# Patient Record
Sex: FEMALE | Race: WHITE | NOT HISPANIC OR LATINO | Employment: FULL TIME | URBAN - METROPOLITAN AREA
[De-identification: names, ages, dates, MRNs, and addresses within clinical notes are randomized per-mention and may not be internally consistent; named-entity substitution may affect disease eponyms.]

---

## 2017-10-04 ENCOUNTER — APPOINTMENT (OUTPATIENT)
Dept: GENERAL RADIOLOGY | Facility: CLINIC | Age: 37
End: 2017-10-04
Attending: EMERGENCY MEDICINE
Payer: OTHER MISCELLANEOUS

## 2017-10-04 ENCOUNTER — HOSPITAL ENCOUNTER (EMERGENCY)
Facility: CLINIC | Age: 37
Discharge: HOME | End: 2017-10-04
Attending: EMERGENCY MEDICINE | Admitting: EMERGENCY MEDICINE
Payer: OTHER MISCELLANEOUS

## 2017-10-04 VITALS
RESPIRATION RATE: 18 BRPM | OXYGEN SATURATION: 99 % | SYSTOLIC BLOOD PRESSURE: 124 MMHG | TEMPERATURE: 98.4 F | WEIGHT: 150.5 LBS | HEART RATE: 64 BPM | DIASTOLIC BLOOD PRESSURE: 94 MMHG
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
## 2017-10-04 DIAGNOSIS — S69.92XA HAND INJURY, LEFT, INITIAL ENCOUNTER: ICD-10-CM

## 2017-10-04 PROCEDURE — 73130 X-RAY EXAM OF HAND: CPT | Mod: LT

## 2017-10-04 PROCEDURE — 99284 EMERGENCY DEPT VISIT MOD MDM: CPT | Mod: Z6 | Performed by: EMERGENCY MEDICINE

## 2017-10-04 PROCEDURE — 25000132 ZZH RX MED GY IP 250 OP 250 PS 637: Performed by: EMERGENCY MEDICINE

## 2017-10-04 PROCEDURE — 99283 EMERGENCY DEPT VISIT LOW MDM: CPT | Performed by: EMERGENCY MEDICINE

## 2017-10-04 RX ORDER — MAGNESIUM HYDROXIDE 1200 MG/15ML
LIQUID ORAL
Status: DISCONTINUED
Start: 2017-10-04 | End: 2017-10-04 | Stop reason: HOSPADM

## 2017-10-04 RX ORDER — IBUPROFEN 600 MG/1
600 TABLET, FILM COATED ORAL ONCE
Status: COMPLETED | OUTPATIENT
Start: 2017-10-04 | End: 2017-10-04

## 2017-10-04 RX ADMIN — IBUPROFEN 600 MG: 600 TABLET ORAL at 12:33

## 2017-10-04 ASSESSMENT — ENCOUNTER SYMPTOMS
NECK STIFFNESS: 0
SHORTNESS OF BREATH: 0
FEVER: 0
COLOR CHANGE: 0
HEADACHES: 0
EYE REDNESS: 0
ABDOMINAL PAIN: 0
ARTHRALGIAS: 0
DIFFICULTY URINATING: 0
CONFUSION: 0

## 2017-10-04 NOTE — DISCHARGE INSTRUCTIONS
You were seen today for crush injury to your hand.  Please make an appointment to follow up with Your Primary Care Provider in 3-4  days if not improving.      Crush Injury of the Hand, No Fracture  You have a crush injury of your hand. This causes local pain, swelling, and sometimes bruising. You don t have any broken bones. This injury may take from a few days to a few weeks to heal.  If a fingernail has been severely injured, it may fall off in 1 to 2 weeks. A new one will usually start to grow back within a month.  Home care  Follow these guidelines when caring for yourself at home:    Keep your hand elevated to reduce pain and swelling. When sitting or lying down keep your arm raised above the level of your heart. You can do this by placing your arm on a pillow that rests on your chest or on a pillow at your side. This is most important during the first 2 days (48 hours) after the injury.    Put an ice pack on the injured area. Do this for 20 minutes every 1 to 2 hours the first day for pain relief. You can make an ice pack by wrapping a plastic bag of ice cubes in a thin towel. As the ice melts, be careful that the cast doesn t get wet. Continue to use the ice pack 3 to 4 times a day until the pain and swelling go away.    You may use acetaminophen or ibuprofen to control pain, unless another pain medicine was prescribed. If you have chronic liver or kidney disease, talk with your health care provider before using these medicines. Also talk with your provider if you ve had a stomach ulcer or GI bleeding.    If you have a splint or cast, keep it dry at all times. Bathe with your splint or cast well out of the water. Protect it with a large plastic bag, rubber-banded at the top end. If a fiberglass cast or splint gets wet, you can dry it with a hair dryer.    Don t put creams or objects under the cast if you have itching.    Don t stick a needle into the wound to drain it.    Bruised skin may change colors over  time. It may change from reddish to bluish to yellowish before the skin goes back to normal coloring.  Follow-up care  Follow up with your health care provider, or as advised, if you don t start to get better within the next 3 days.  If X-rays were taken, a radiologist will look at them. You will be told of any new findings that may affect your care.  When to seek medical advice  Call your health care provider right away if any of these occur:    The cast cracks    The plaster cast or splint becomes wet or soft    The fiberglass cast or splint stays wet for more than 24 hours    Tightness or pain under the cast or splint gets worse    Fingers become swollen, cold, blue, numb, or tingly    Redness, warmth, swelling, drainage from the wound, or foul odor from a cast or splint    You can t move your fingers    Fever of 101 F (38.3 C) or higher, or as directed by your health care provider   Date Last Reviewed: 2/15/2015    8126-7348 [employer]. [address] [place] [address]. All rights reserved. This information is not intended as a substitute for professional medical care. Always follow your healthcare professional's instructions.

## 2017-10-04 NOTE — ED NOTES
Arrived to ED d/t c/o hand injury to left side, smashed hand between elevator and a box at work,  laceration noted, swelling noted to middle finger, ring cut off with ring cutter, bleeding controlled, CMS intact, VSS, Tetanus shot up-to-date per [facility]

## 2017-10-04 NOTE — ED AVS SNAPSHOT
[facility], [place], Emergency Department    [address] [place] [address]    Phone:  [phone]                                       Joelle Abrams   MRN: [id number]    Department:  [facility], Emergency Department   Date of Visit:  10/4/2017           After Visit Summary Signature Page     I have received my discharge instructions, and my questions have been answered. I have discussed any challenges I see with this plan with the nurse or doctor.    ..........................................................................................................................................  Patient/Patient Representative Signature      ..........................................................................................................................................  Patient Representative Print Name and Relationship to Patient    ..................................................               ................................................  Date                                            Time    ..........................................................................................................................................  Reviewed by Signature/Title    ...................................................              ..............................................  Date                                                            Time

## 2017-10-04 NOTE — ED AVS SNAPSHOT
[facility], Emergency Department    [address] [facility] [address]    Phone:  [phone]                                       Joelle Abrams   MRN: [id number]    Department:  [facility], Emergency Department   Date of Visit:  10/4/2017           Patient Information     Date Of Birth          1980        Your diagnoses for this visit were:     Hand injury, left, initial encounter        You were seen by Kristen Carter MD.        Discharge Instructions       You were seen today for crush injury to your hand.  Please make an appointment to follow up with Your Primary Care Provider in 3-4  days if not improving.      Crush Injury of the Hand, No Fracture  You have a crush injury of your hand. This causes local pain, swelling, and sometimes bruising. You don t have any broken bones. This injury may take from a few days to a few weeks to heal.  If a fingernail has been severely injured, it may fall off in 1 to 2 weeks. A new one will usually start to grow back within a month.  Home care  Follow these guidelines when caring for yourself at home:    Keep your hand elevated to reduce pain and swelling. When sitting or lying down keep your arm raised above the level of your heart. You can do this by placing your arm on a pillow that rests on your chest or on a pillow at your side. This is most important during the first 2 days (48 hours) after the injury.    Put an ice pack on the injured area. Do this for 20 minutes every 1 to 2 hours the first day for pain relief. You can make an ice pack by wrapping a plastic bag of ice cubes in a thin towel. As the ice melts, be careful that the cast doesn t get wet. Continue to use the ice pack 3 to 4 times a day until the pain and swelling go away.    You may use acetaminophen or ibuprofen to control pain, unless another pain medicine was prescribed. If you have chronic liver or kidney disease, talk with your health care provider before using these  medicines. Also talk with your provider if you ve had a stomach ulcer or GI bleeding.    If you have a splint or cast, keep it dry at all times. Bathe with your splint or cast well out of the water. Protect it with a large plastic bag, rubber-banded at the top end. If a fiberglass cast or splint gets wet, you can dry it with a hair dryer.    Don t put creams or objects under the cast if you have itching.    Don t stick a needle into the wound to drain it.    Bruised skin may change colors over time. It may change from reddish to bluish to yellowish before the skin goes back to normal coloring.  Follow-up care  Follow up with your health care provider, or as advised, if you don t start to get better within the next 3 days.  If X-rays were taken, a radiologist will look at them. You will be told of any new findings that may affect your care.  When to seek medical advice  Call your health care provider right away if any of these occur:    The cast cracks    The plaster cast or splint becomes wet or soft    The fiberglass cast or splint stays wet for more than 24 hours    Tightness or pain under the cast or splint gets worse    Fingers become swollen, cold, blue, numb, or tingly    Redness, warmth, swelling, drainage from the wound, or foul odor from a cast or splint    You can t move your fingers    Fever of 101 F (38.3 C) or higher, or as directed by your health care provider   Date Last Reviewed: 2/15/2015    3083-9153 [employer]. [address] [place] [address]. All rights reserved. This information is not intended as a substitute for professional medical care. Always follow your healthcare professional's instructions.          24 Hour Appointment Hotline       To make an appointment at any [facility], call [phone] ([phone]). If you don't have a family doctor or clinic, we will help you find one. [facility] are conveniently located to serve the needs of you and your  "family.             Review of your medicines      Notice     You have not been prescribed any medications.            Procedures and tests performed during your visit     XR Hand Left G/E 3 Views      Orders Needing Specimen Collection     None      Pending Results     No orders found from 10/2/2017 to 10/5/2017.            Pending Culture Results     No orders found from 10/2/2017 to 10/5/2017.            Pending Results Instructions     If you had any lab results that were not finalized at the time of your Discharge, you can call the ED Lab Result RN at [phone]. You will be contacted by this team for any positive Lab results or changes in treatment. The nurses are available 7 days a week from 10A to 6:30P.  You can leave a message 24 hours per day and they will return your call.        Thank you for choosing [facility]       Thank you for choosing [facility] for your care. Our goal is always to provide you with excellent care. Hearing back from our patients is one way we can continue to improve our services. Please take a few minutes to complete the written survey that you may receive in the mail after you visit with us. Thank you!        [employer] Information     [employer] lets you send messages to your doctor, view your test results, renew your prescriptions, schedule appointments and more. To sign up, go to [email or web address] . Click on \"Log in\" on the left side of the screen, which will take you to the Welcome page. Then click on \"Sign up Now\" on the right side of the page.     You will be asked to enter the access code listed below, as well as some personal information. Please follow the directions to create your username and password.     Your access code is: [email or web address]  Expires: 2018 12:13 PM     Your access code will  in 90 days. If you need help or a new code, please call your [facility] clinic or [phone].        Care EveryWhere ID     This is your Care EveryWhere ID. This could be used " by other organizations to access your [facility] medical records  [id number]        Equal Access to Services     CECILIA CLINTON : Neel Graham, silva santoyo, devendra pisano. So [facility] [phone].    ATENCIÓN: Si habla español, tiene a carrillo disposición servicios gratuitos de asistencia lingüística. Llame al [phone].    We comply with applicable federal civil rights laws and [place] laws. We do not discriminate on the basis of race, color, national origin, age, disability, sex, sexual orientation, or gender identity.            After Visit Summary       This is your record. Keep this with you and show to your community pharmacist(s) and doctor(s) at your next visit.

## 2017-10-04 NOTE — ED PROVIDER NOTES
History     Chief Complaint   Patient presents with     Hand Injury     The history is provided by the patient.     Joelle Abrams is a 36 year old otherwise healthy right hand dominant female who presents with left hand crush injury.    Patient was moving a box out of an elevator at work and crushed her hand at the 2-5th proximal phalanx and immediately presented to the ED. She did not injure her thumb, palm, wrist, or arm. No injury to her head.     Patient reports 7/10 pain along area of injury, does not radiate. She also reports swelling and pain limiting her range of motion and numbness in her fingers.     She denies any systemic symptoms including no fevers, chills, chest pain, shortness of breath, abdominal pain, nausea/vomiting.     I have reviewed the Medications, Allergies, Past Medical and Surgical History, and Social History in the Epic system.    Review of Systems   Constitutional: Negative for fever.   HENT: Negative for congestion.    Eyes: Negative for redness.   Respiratory: Negative for shortness of breath.    Cardiovascular: Negative for chest pain.   Gastrointestinal: Negative for abdominal pain.   Genitourinary: Negative for difficulty urinating.   Musculoskeletal: Negative for arthralgias and neck stiffness.   Skin: Negative for color change.   Neurological: Negative for headaches.   Psychiatric/Behavioral: Negative for confusion.     History reviewed. No pertinent past medical history.    History reviewed. No pertinent surgical history.    No family history on file.    Social History   Substance Use Topics     Smoking status: Never Smoker     Smokeless tobacco: Never Used     Alcohol use Not on file     Physical Exam   BP: (!) 123/97  Pulse: 60  Temp: 98.4  F (36.9  C)  Resp: 18  Weight: 68.3 kg (150 lb 8 oz)  SpO2: 100 %  Physical Exam   Constitutional: She appears well-developed and well-nourished. No distress.   HENT:   Head: Normocephalic and atraumatic.   Mouth/Throat: Oropharynx is  clear and moist.   Eyes: No scleral icterus.   Cardiovascular: Normal rate, regular rhythm and normal heart sounds.  Exam reveals no gallop and no friction rub.    No murmur heard.  Pulmonary/Chest: Effort normal and breath sounds normal. No respiratory distress. She has no wheezes. She has no rales.   Abdominal: Soft. She exhibits no distension. There is no tenderness. There is no rebound and no guarding.   Musculoskeletal:        Left wrist: Normal. She exhibits normal range of motion, no tenderness, no bony tenderness, no swelling, no effusion, no deformity and no laceration.   L hand superficial abrasion with small superficial skin tears with small oozing blood on 2nd, 3rd, and 4th proximal phalanx with swelling around 2nd-5th MCP joint, 5/5 flexion and extension at DIP and PIP joint at the left hand.  All fingertips warm and pink. Intact capillary refill, intact sensation to light touch.     Neurological: She is alert. No cranial nerve deficit.   Skin: Skin is warm and dry.     ED Course     ED Course     3-view x-ray of left hand and wrist:  FINDINGS: AP, oblique, and lateral views of the left hand and wrist  were obtained. The bones are well aligned. The joint spaces are  well-maintained. No displaced fractures. Mild irregularity in the soft  tissues at the level of the left fourth proximal phalanx.  IMPRESSION: No acute bone abnormality in the left hand or wrist.    Procedures        Critical Care time:  none   Assessments & Plan (with Medical Decision Making)   Joelle Abrams is a 36 year old otherwise healthy right hand dominant female who presents with left hand crush injury without evidence of fracture.  Patient with intact flexion and and extension so no concern for flexor or extensor injuries. No concern for nerve or vascular compromise at this time. Patient's left arm was bandaged and wrapped. Instructed patient to keep hand elevated to reduce pain and swelling and to use acetaminophen or ibuprofen to  control the pain.  Instructed patient to return for re-evaluation if develops fever, loss of sensation in fingers, purulent drainage from the wound, or any concerning symptoms.    I have reviewed the nursing notes.    I have reviewed the findings, diagnosis, plan and need for follow up with the patient.    Final diagnoses:   Hand injury, left, initial encounter     10/4/2017   [facility], [place], EMERGENCY DEPARTMENT    Nancy Marley MD     Attending addendum:  This data collected with the Resident working in the Emergency Department.  Patient was seen and evaluated by myself and I repeated the history and physical exam with the patient.  The plan of care was discussed with them.  The key portions of the note including the entire assessment and plan reflect my documentation.    Tetanus status is up to date.     My exam:  left hand has swelling and tenderness of 2nd-5th MCPs and proximal phalynges. Superficial skin tear on dorsum of the proximal phaylnx of the 2nd-4th fingers. Also skin tear with hemorrhagic blister on palmar surface of proximal phaylnx of index and middle fingers. No full thickness skin lacerations. Distal capillary refill in all fingers is intact, sensation intact in all fingers and thumb and strength with flexion and extension intact at MCP, PIP and DIP joints of all fingers.     X-ray reviewed by me and results shown above.  Wounds were irrigated and dressed. Wound care and signs of infection, reasons to return were discussed with the patient.         Kristen Carter MD  10/04/17 2015

## 2021-05-27 ENCOUNTER — OFFICE VISIT (OUTPATIENT)
Dept: FAMILY MEDICINE | Facility: CLINIC | Age: 41
End: 2021-05-27
Payer: COMMERCIAL

## 2021-05-27 VITALS
DIASTOLIC BLOOD PRESSURE: 68 MMHG | RESPIRATION RATE: 16 BRPM | OXYGEN SATURATION: 100 % | WEIGHT: 138.3 LBS | SYSTOLIC BLOOD PRESSURE: 98 MMHG | HEART RATE: 58 BPM | HEIGHT: 69 IN | BODY MASS INDEX: 20.48 KG/M2

## 2021-05-27 DIAGNOSIS — Z00.00 ROUTINE GENERAL MEDICAL EXAMINATION AT A HEALTH CARE FACILITY: Primary | ICD-10-CM

## 2021-05-27 DIAGNOSIS — Z30.09 FAMILY PLANNING: ICD-10-CM

## 2021-05-27 DIAGNOSIS — Z11.3 SCREEN FOR STD (SEXUALLY TRANSMITTED DISEASE): ICD-10-CM

## 2021-05-27 DIAGNOSIS — G43.709 CHRONIC MIGRAINE WITHOUT AURA WITHOUT STATUS MIGRAINOSUS, NOT INTRACTABLE: ICD-10-CM

## 2021-05-27 DIAGNOSIS — R87.810 CERVICAL HIGH RISK HPV (HUMAN PAPILLOMAVIRUS) TEST POSITIVE: ICD-10-CM

## 2021-05-27 DIAGNOSIS — R53.83 OTHER FATIGUE: ICD-10-CM

## 2021-05-27 DIAGNOSIS — E55.9 VITAMIN D DEFICIENCY: ICD-10-CM

## 2021-05-27 DIAGNOSIS — N94.6 DYSMENORRHEA: ICD-10-CM

## 2021-05-27 LAB
DEPRECATED CALCIDIOL+CALCIFEROL SERPL-MC: 37 UG/L (ref 20–75)
ERYTHROCYTE [DISTWIDTH] IN BLOOD BY AUTOMATED COUNT: 12.9 % (ref 10–15)
FERRITIN SERPL-MCNC: 38 NG/ML (ref 12–150)
HCT VFR BLD AUTO: 38.8 % (ref 35–47)
HGB BLD-MCNC: 13 G/DL (ref 11.7–15.7)
HIV 1+2 AB+HIV1 P24 AG SERPL QL IA: NONREACTIVE
MCH RBC QN AUTO: 31.5 PG (ref 26.5–33)
MCHC RBC AUTO-ENTMCNC: 33.5 G/DL (ref 31.5–36.5)
MCV RBC AUTO: 94 FL (ref 78–100)
PLATELET # BLD AUTO: 261 10E9/L (ref 150–450)
RBC # BLD AUTO: 4.13 10E12/L (ref 3.8–5.2)
T PALLIDUM AB SER QL: NONREACTIVE
TSH SERPL DL<=0.005 MIU/L-ACNC: 2.51 MU/L (ref 0.4–4)
WBC # BLD AUTO: 4.2 10E9/L (ref 4–11)

## 2021-05-27 PROCEDURE — 82306 VITAMIN D 25 HYDROXY: CPT | Performed by: FAMILY MEDICINE

## 2021-05-27 PROCEDURE — 85027 COMPLETE CBC AUTOMATED: CPT | Performed by: FAMILY MEDICINE

## 2021-05-27 PROCEDURE — 82728 ASSAY OF FERRITIN: CPT | Performed by: FAMILY MEDICINE

## 2021-05-27 PROCEDURE — 99000 SPECIMEN HANDLING OFFICE-LAB: CPT | Performed by: FAMILY MEDICINE

## 2021-05-27 PROCEDURE — 99214 OFFICE O/P EST MOD 30 MIN: CPT | Mod: 25 | Performed by: FAMILY MEDICINE

## 2021-05-27 PROCEDURE — 87389 HIV-1 AG W/HIV-1&-2 AB AG IA: CPT | Performed by: FAMILY MEDICINE

## 2021-05-27 PROCEDURE — 87624 HPV HI-RISK TYP POOLED RSLT: CPT | Performed by: FAMILY MEDICINE

## 2021-05-27 PROCEDURE — 84443 ASSAY THYROID STIM HORMONE: CPT | Performed by: FAMILY MEDICINE

## 2021-05-27 PROCEDURE — 99385 PREV VISIT NEW AGE 18-39: CPT | Performed by: FAMILY MEDICINE

## 2021-05-27 PROCEDURE — 36415 COLL VENOUS BLD VENIPUNCTURE: CPT | Performed by: FAMILY MEDICINE

## 2021-05-27 PROCEDURE — 87491 CHLMYD TRACH DNA AMP PROBE: CPT | Performed by: FAMILY MEDICINE

## 2021-05-27 PROCEDURE — 86780 TREPONEMA PALLIDUM: CPT | Mod: 90 | Performed by: FAMILY MEDICINE

## 2021-05-27 PROCEDURE — 88175 CYTOPATH C/V AUTO FLUID REDO: CPT | Performed by: FAMILY MEDICINE

## 2021-05-27 PROCEDURE — 87591 N.GONORRHOEAE DNA AMP PROB: CPT | Performed by: FAMILY MEDICINE

## 2021-05-27 RX ORDER — ACETAMINOPHEN AND CODEINE PHOSPHATE 120; 12 MG/5ML; MG/5ML
0.35 SOLUTION ORAL
COMMUNITY
Start: 2021-03-25 | End: 2021-05-27

## 2021-05-27 RX ORDER — ACETAMINOPHEN AND CODEINE PHOSPHATE 120; 12 MG/5ML; MG/5ML
0.35 SOLUTION ORAL DAILY
Qty: 90 TABLET | Refills: 3 | Status: SHIPPED | OUTPATIENT
Start: 2021-05-27 | End: 2022-05-31

## 2021-05-27 SDOH — HEALTH STABILITY: MENTAL HEALTH: HOW MANY STANDARD DRINKS CONTAINING ALCOHOL DO YOU HAVE ON A TYPICAL DAY?: NOT ASKED

## 2021-05-27 SDOH — HEALTH STABILITY: MENTAL HEALTH: HOW OFTEN DO YOU HAVE A DRINK CONTAINING ALCOHOL?: NEVER

## 2021-05-27 SDOH — HEALTH STABILITY: MENTAL HEALTH: HOW OFTEN DO YOU HAVE 6 OR MORE DRINKS ON ONE OCCASION?: NEVER

## 2021-05-27 ASSESSMENT — MIFFLIN-ST. JEOR: SCORE: 1371.7

## 2021-05-27 NOTE — PROGRESS NOTES
"    Assessment & Plan     Throat pain  Viral vs post nasal drainage.  Supportive care.  Follow up if symptoms persist or worsen   - Streptococcus A Rapid Scr w Reflx to PCR  - Group A Streptococcus PCR Throat Swab                 Return in about 1 week (around 6/4/2021) for If symptoms persist or worsen.    ROSALINE Alarcon [facility]   Joelle is a 40 year old who presents for the following health issues     HPI     Acute Illness  Acute illness concerns: sore throat   Onset/Duration: Monday   Symptoms:  Fever: no  Chills/Sweats: no  Headache (location?): YES  Sinus Pressure: no  Conjunctivitis:  no  Ear Pain: YES- pressure bilateral   Rhinorrhea: no  Congestion: no  Sore Throat: YES  Cough: no  Wheeze: no  Decreased Appetite: no  Nausea: no  Vomiting: no  Diarrhea: no  Dysuria/Freq.: no  Dysuria or Hematuria: no  Fatigue/Achiness: YES  Sick/Strep Exposure: no  Therapies tried and outcome: ibuprofen       Review of Systems   Constitutional, HEENT, cardiovascular, pulmonary, gi and gu systems are negative, except as otherwise noted.      Objective    BP 96/66   Pulse 60   Temp 97.3  F (36.3  C) (Skin)   Resp 16   Ht 1.753 m (5' 9\")   Wt 62.6 kg (138 lb)   LMP 04/01/2021   SpO2 99%   BMI 20.38 kg/m    Body mass index is 20.38 kg/m .  Physical Exam   GENERAL: alert and no distress  EYES: Eyes grossly normal to inspection  HENT: normal cephalic/atraumatic, ear canals and TM's normal, nose and mouth without ulcers or lesions, oropharynx clear and tonsillar hypertrophy  RESP: lungs clear to auscultation - no rales, rhonchi or wheezes  CV: regular rate and rhythm, normal S1 S2, no S3 or S4, no murmur, click or rub, no peripheral edema and peripheral pulses strong    Results for orders placed or performed in visit on 05/28/21 (from the past 24 hour(s))   Streptococcus A Rapid Scr w Reflx to PCR    Specimen: Throat   Result Value Ref Range    Strep Specimen Description " Throat     Streptococcus Group A Rapid Screen Negative NEG^Negative

## 2021-05-27 NOTE — PROGRESS NOTES
SUBJECTIVE:   CC: Joelle Abrams is an 40 year old woman who presents for preventive health visit.       Patient has been advised of split billing requirements and indicates understanding: Yes  Healthy Habits:     Getting at least 3 servings of Calcium per day:  NO    Bi-annual eye exam:  Yes    Dental care twice a year:  Yes    Sleep apnea or symptoms of sleep apnea:  Daytime drowsiness    Diet:  Regular (no restrictions)    Frequency of exercise:  2-3 days/week    Duration of exercise:  45-60 minutes    Taking medications regularly:  Yes    Medication side effects:  None    PHQ-2 Total Score: 1    Additional concerns today:  Yes  1. Abnormal pap   2010 NILM  2012 ASCUS, HPV negative  2015 NILM, HPV negative  2018 LSIL, HPV Positive for High Risk HPV types other than 16 or 18  Colposcopy: FAUZIA 1  2019 ASCUS, HPV High Risk Other Than 16/18 detected 38 y.o.   Colposcopy: No FAUZIA  Plan: Cotesting (pap+HPV) in    2. dysmenorrhea & need fr Tylenol # 3   3. fatigue and low vitamin d.  Has been taking over the counter vitamin D- about 5000 international unit once daily or twice daily . Duncan not think she is depressed and lethargy cause her to be less interested in pleasurable activities . Unable to run outside and hike like before  Telemarketing job/ sedentary job.  4. Requesting screening for sexually transmitted infection as well     PROBLEMS TO ADD ON...    Today's PHQ-2 Score:   PHQ-2 ( 1999 Pfizer) 5/27/2021   Q1: Little interest or pleasure in doing things 1   Q2: Feeling down, depressed or hopeless 0   PHQ-2 Score 1   Q1: Little interest or pleasure in doing things Several days   Q2: Feeling down, depressed or hopeless Not at all   PHQ-2 Score 1       Abuse: Current or Past (Physical, Sexual or Emotional) - No  Do you feel safe in your environment? Yes    Have you ever done Advance Care Planning? (For example, a Health Directive, POLST, or a discussion with a medical provider or your loved ones about your  wishes): No, advance care planning information given to patient to review.  Patient declined advance care planning discussion at this time.    Social History     Tobacco Use     Smoking status: Never Smoker     Smokeless tobacco: Never Used   Substance Use Topics     Alcohol use: Never     Frequency: Never     Binge frequency: Never     If you drink alcohol do you typically have >3 drinks per day or >7 drinks per week? No    Alcohol Use 5/27/2021   Prescreen: >3 drinks/day or >7 drinks/week? No   Prescreen: >3 drinks/day or >7 drinks/week? -   No flowsheet data found.    Reviewed orders with patient.  Reviewed health maintenance and updated orders accordingly - Yes  BP Readings from Last 3 Encounters:   05/27/21 98/68   10/04/17 (!) 124/94   12/17/05 110/74    Wt Readings from Last 3 Encounters:   05/27/21 62.7 kg (138 lb 4.8 oz)   10/04/17 68.3 kg (150 lb 8 oz)                    Breast Cancer Screening:    Breast CA Risk Assessment (FHS-7) 5/27/2021   Do you have a family history of breast, colon, or ovarian cancer? No / Unknown       click delete button to remove this line now    Pertinent mammograms are reviewed under the imaging tab.    History of abnormal Pap smear: YES - updated in Problem List and Health Maintenance accordingly     Reviewed and updated as needed this visit by clinical staff  Tobacco  Allergies  Meds  Problems  Med Hx  Surg Hx  Fam Hx          Reviewed and updated as needed this visit by Provider  Tobacco  Allergies  Meds  Problems  Med Hx  Surg Hx  Fam Hx             Review of Systems  CONSTITUTIONAL: NEGATIVE for fever, chills, change in weight  INTEGUMENTARU/SKIN: NEGATIVE for worrisome rashes, moles or lesions  EYES: NEGATIVE for vision changes or irritation  ENT: NEGATIVE for ear, mouth and throat problems  RESP: NEGATIVE for significant cough or SOB  BREAST: NEGATIVE for masses, tenderness or discharge  CV: NEGATIVE for chest pain, palpitations or peripheral edema  GI:  "NEGATIVE for nausea, abdominal pain, heartburn, or change in bowel habits  : NEGATIVE for unusual urinary or vaginal symptoms. Periods are regular.  MUSCULOSKELETAL: NEGATIVE for significant arthralgias or myalgia  NEURO: NEGATIVE for weakness, dizziness or paresthesias  ENDOCRINE: NEGATIVE for temperature intolerance, skin/hair changes  PSYCHIATRIC: NEGATIVE for changes in mood or affect     OBJECTIVE:   BP 98/68   Pulse 58   Resp 16   Ht 1.753 m (5' 9\")   Wt 62.7 kg (138 lb 4.8 oz)   LMP 04/20/2021 (Approximate)   SpO2 100%   Breastfeeding No   BMI 20.42 kg/m    Physical Exam  GENERAL: healthy, alert and no distress  EYES: Eyes grossly normal to inspection, PERRL and conjunctivae and sclerae normal  HENT: ear canals and TM's normal, nose and mouth without ulcers or lesions  NECK: no adenopathy, no asymmetry, masses, or scars and thyroid normal to palpation  RESP: lungs clear to auscultation - no rales, rhonchi or wheezes  BREAST: normal without masses, tenderness or nipple discharge and no palpable axillary masses or adenopathy  CV: regular rate and rhythm, normal S1 S2, no S3 or S4, no murmur, click or rub, no peripheral edema and peripheral pulses strong  ABDOMEN: soft, nontender, no hepatosplenomegaly, no masses and bowel sounds normal   (female): normal female external genitalia, normal urethral meatus, vaginal mucosa pink, moist, well rugated, and normal cervix/adnexa/uterus without masses or discharge  MS: no gross musculoskeletal defects noted, no edema  SKIN: no suspicious lesions or rashes  NEURO: Normal strength and tone, mentation intact and speech normal  PSYCH: mentation appears normal, affect normal/bright    Diagnostic Test Results:  Labs reviewed in Epic    ASSESSMENT/PLAN:   Joelle was seen today for physical.    Diagnoses and all orders for this visit:    Routine general medical examination at a health care facility    Chronic migraine without aura without status migrainosus, not " intractable  Comments:  Infrequent headache - with progesterone only pills.      Cervical high risk HPV (human papillomavirus) test positive  -     Pap imaged thin layer diagnostic with HPV (select HPV order below)  -     HPV High Risk Types DNA Cervical  Abnormal pap   2010 NILM  2012 ASCUS, HPV negative  2015 NILM, HPV negative  2018 LSIL, HPV Positive for High Risk HPV types other than 16 or 18  Colposcopy: FAUZIA 1  2019 ASCUS, HPV High Risk Other Than 16/18 detected 38 y.o.   Colposcopy: No FAUZIA  Plan: Cotesting (pap+HPV) in  -completed 05/27/21    Dysmenorrhea  Comments:  She is given  # 10 tabs of requested tylenol # 3 for sever cramps - uses 1 once or twce each period  She is also willing to see OB. last OB visit 2019  Orders:  -     acetaminophen-codeine (TYLENOL #3) 300-30 MG tablet; Take 1 tablet by mouth once as needed for severe pain (dysmennorhea)  -     OB/GYN REFERRAL    Other fatigue  Comments:  fatige is making her unmotivated to excercise, and she reports its happened in past due to vitamin D. She has been taking vitamin D over the counter on and off.  She is a runner and not wanting to be running- a bit unusual for her.  Orders:  -     Vitamin D Deficiency  -     CBC with platelets  -     Ferritin  -     TSH with free T4 reflex    Vitamin D deficiency  Comments:  history of low ashley in D- 10/2020    Family planning  Comments:  need refill on ocp  Orders:  -     norethindrone (MICRONOR) 0.35 MG tablet; Take 1 tablet (0.35 mg) by mouth daily    Screen for STD (sexually transmitted disease)  -     Chlamydia trachomatis PCR  -     Neisseria gonorrhoeae PCR  -     Treponema Abs w Reflex to RPR and Titer  -     HIV Antigen Antibody Combo    Other orders  -     Cancel: Pap imaged thin layer screen with HPV - recommended age 30 - 65 years (select HPV order below)  -     Cancel: HPV High Risk Types DNA Cervical        Patient has been advised of split billing requirements and indicates understanding:  "Yes  COUNSELING:  Reviewed preventive health counseling, as reflected in patient instructions       Regular exercise       Healthy diet/nutrition       Vision screening       Hearing screening       Alcohol Use       Contraception       Family planning       Folic Acid       Safe sex practices/STD prevention       Consider Hep C screening for all patients one time for ages 18-79 years    Estimated body mass index is 20.42 kg/m  as calculated from the following:    Height as of this encounter: 1.753 m (5' 9\").    Weight as of this encounter: 62.7 kg (138 lb 4.8 oz).        She reports that she has never smoked. She has never used smokeless tobacco.      Counseling Resources:  ATP IV Guidelines  Pooled Cohorts Equation Calculator  Breast Cancer Risk Calculator  BRCA-Related Cancer Risk Assessment: FHS-7 Tool  FRAX Risk Assessment  ICSI Preventive Guidelines  Dietary Guidelines for Americans, 2010  USDA's MyPlate  ASA Prophylaxis  Lung CA Screening    Courtney Hedrick MD  [facility]  "

## 2021-05-28 ENCOUNTER — OFFICE VISIT (OUTPATIENT)
Dept: FAMILY MEDICINE | Facility: CLINIC | Age: 41
End: 2021-05-28
Payer: COMMERCIAL

## 2021-05-28 VITALS
RESPIRATION RATE: 16 BRPM | HEART RATE: 60 BPM | DIASTOLIC BLOOD PRESSURE: 66 MMHG | OXYGEN SATURATION: 99 % | WEIGHT: 138 LBS | HEIGHT: 69 IN | BODY MASS INDEX: 20.44 KG/M2 | TEMPERATURE: 97.3 F | SYSTOLIC BLOOD PRESSURE: 96 MMHG

## 2021-05-28 DIAGNOSIS — R07.0 THROAT PAIN: Primary | ICD-10-CM

## 2021-05-28 LAB
C TRACH DNA SPEC QL NAA+PROBE: NEGATIVE
DEPRECATED S PYO AG THROAT QL EIA: NEGATIVE
N GONORRHOEA DNA SPEC QL NAA+PROBE: NEGATIVE
SPECIMEN SOURCE: NORMAL
STREP GROUP A PCR: NOT DETECTED

## 2021-05-28 PROCEDURE — 99213 OFFICE O/P EST LOW 20 MIN: CPT | Performed by: PHYSICIAN ASSISTANT

## 2021-05-28 PROCEDURE — 99N1174 PR STATISTIC STREP A RAPID: Performed by: PHYSICIAN ASSISTANT

## 2021-05-28 PROCEDURE — 87651 STREP A DNA AMP PROBE: CPT | Performed by: PHYSICIAN ASSISTANT

## 2021-05-28 ASSESSMENT — MIFFLIN-ST. JEOR: SCORE: 1370.34

## 2021-05-29 NOTE — RESULT ENCOUNTER NOTE
All labs negative & within normal limits.  No anemia, no shortage of iron in blood . Thyroid is normal .  Normal vitamin D level , keep taking over the counter vitamin D 1000 international unit once daily , should be enough.  Negative for all checked sexually transmitted infection      Please keep us posted with questions or concerns .      Best Regards,    Courtney Hedrick MD  [facility]  [phone]

## 2021-06-01 LAB
COPATH REPORT: NORMAL
PAP: NORMAL

## 2021-06-03 LAB
FINAL DIAGNOSIS: ABNORMAL
HPV HR 12 DNA CVX QL NAA+PROBE: POSITIVE
HPV16 DNA SPEC QL NAA+PROBE: NEGATIVE
HPV18 DNA SPEC QL NAA+PROBE: NEGATIVE
SPECIMEN DESCRIPTION: ABNORMAL
SPECIMEN SOURCE CVX/VAG CYTO: ABNORMAL

## 2021-06-07 ENCOUNTER — PATIENT OUTREACH (OUTPATIENT)
Dept: FAMILY MEDICINE | Facility: CLINIC | Age: 41
End: 2021-06-07

## 2021-09-11 ENCOUNTER — HEALTH MAINTENANCE LETTER (OUTPATIENT)
Age: 41
End: 2021-09-11

## 2022-05-09 ENCOUNTER — PATIENT OUTREACH (OUTPATIENT)
Dept: FAMILY MEDICINE | Facility: CLINIC | Age: 42
End: 2022-05-09
Payer: COMMERCIAL

## 2022-05-09 DIAGNOSIS — R87.810 CERVICAL HIGH RISK HPV (HUMAN PAPILLOMAVIRUS) TEST POSITIVE: Primary | ICD-10-CM

## 2022-05-09 NOTE — LETTER
May 9, 2022      Joelle Abrams  [address]  [place] [address]        Dear ,    This letter is to remind you that you are due for your follow-up Pap smear and Human Papillomavirus (HPV) test.    Please call [phone] to schedule your appointment at your earliest convenience.    If you have completed the appointment outside of the [facility] system, please have the records forwarded to our office. We will update your chart for your provider to review before your next annual wellness visit.     Thank you for choosing [facility]!      Sincerely,    Your [facility] Care Team

## 2022-07-01 NOTE — TELEPHONE ENCOUNTER
2010 NIL Pap  01/16/12 ASCUS Pap, Neg HR HPV  01/28/15 NIL Pap, Neg HR HPV   06/7/18 LSIL Pap, + HR HPV (not 16 or 18)  Colposcopy: FAUZIA 1  10/10/19 ASCUS Pap, + HR HPV (not 16 or 18)  11/21/19 Colposcopy: No FAUZIA Plan: Cotesting (pap+HPV) in  - not completed due to covid  05/27/21 NIL Pap, + HR HPV (not 16 or 18) Plan cotest in 1 year due 05/27/22.  Results and recommendations released to the pt through [employer] , pt viewed.  5/9/22 Reminder letter  6/6/22 Reminder call - spoke with patient  7/1/22 Lost to follow-up for pap tracking

## 2022-08-13 ENCOUNTER — HEALTH MAINTENANCE LETTER (OUTPATIENT)
Age: 42
End: 2022-08-13

## 2022-10-14 ENCOUNTER — E-VISIT (OUTPATIENT)
Dept: URGENT CARE | Facility: CLINIC | Age: 42
End: 2022-10-14
Payer: COMMERCIAL

## 2022-10-14 DIAGNOSIS — N39.0 ACUTE UTI (URINARY TRACT INFECTION): Primary | ICD-10-CM

## 2022-10-14 PROCEDURE — 99421 OL DIG E/M SVC 5-10 MIN: CPT | Performed by: NURSE PRACTITIONER

## 2022-10-14 RX ORDER — NITROFURANTOIN 25; 75 MG/1; MG/1
100 CAPSULE ORAL 2 TIMES DAILY
Qty: 10 CAPSULE | Refills: 0 | Status: SHIPPED | OUTPATIENT
Start: 2022-10-14 | End: 2022-10-19

## 2022-10-15 NOTE — PATIENT INSTRUCTIONS
Dear Joelle Abrams    After reviewing your responses, I've been able to diagnose you with a urinary tract infection, which is a common infection of the bladder with bacteria.  This is not a sexually transmitted infection, though urinating immediately after intercourse can help prevent infections.  Drinking lots of fluids is also helpful to clear your current infection and prevent the next one.      I have sent a prescription for antibiotics to your pharmacy to treat this infection.    It is important that you take all of your prescribed medication even if your symptoms are improving after a few doses.  Taking all of your medicine helps prevent the symptoms from returning.     If your symptoms worsen, you develop pain in your back or stomach, develop fevers, or are not improving in 5 days, please contact your primary care provider for an appointment or visit any of our convenient Walk-in or Urgent Care Centers to be seen, which can be found on our website here.    Thanks again for choosing us as your health care partner,    Christine Madrid CNP    Urinary Tract Infections in Women  Urinary tract infections (UTIs) are most often caused by bacteria. These bacteria enter the urinary tract. The bacteria may come from inside the body. Or they may travel from the skin outside the rectum or vagina into the urethra. Female anatomy makes it easy for bacteria from the bowel to enter a woman s urinary tract, which is the most common source of UTI. This means women develop UTIs more often than men. Pain in or around the urinary tract is a common UTI symptom. But the only way to know for sure if you have a UTI for the healthcare provider to test your urine. The two tests that may be done are the urinalysis and urine culture.     Types of UTIs    Cystitis. A bladder infection (cystitis) is the most common UTI in women. You may have urgent or frequent need to pee. You may also have pain, burning when you pee, and bloody  urine.    Urethritis. This is an inflamed urethra, which is the tube that carries urine from the bladder to outside the body. You may have lower stomach or back pain. You may also have urgent or frequent need to pee.    Pyelonephritis. This is a kidney infection. If not treated, it can be serious and damage your kidneys. In severe cases, you may need to stay in the hospital. You may have a fever and lower back pain.    Medicines to treat a UTI  Most UTIs are treated with antibiotics. These kill the bacteria. The length of time you need to take them depends on the type of infection. It may be as short as 3 days. If you have repeated UTIs, you may need a low-dose antibiotic for several months. Take antibiotics exactly as directed. Don t stop taking them until all of the medicine is gone. If you stop taking the antibiotic too soon, the infection may not go away. You may also develop a resistance to the antibiotic. This can make it much harder to treat.   Lifestyle changes to treat and prevent UTIs   The lifestyle changes below will help get rid of your UTI. They may also help prevent future UTIs.     Drink plenty of fluids. This includes water, juice, or other caffeine-free drinks. Fluids help flush bacteria out of your body.    Empty your bladder. Always empty your bladder when you feel the urge to pee. And always pee before going to sleep. Urine that stays in your bladder can lead to infection. Try to pee before and after sex as well.    Practice good personal hygiene. Wipe yourself from front to back after using the toilet. This helps keep bacteria from getting into the urethra.    Use condoms during sex. These help prevent UTIs caused by sexually transmitted bacteria. Also don't use spermicides during sex. These can increase the risk for UTIs. Choose other forms of birth control instead. For women who tend to get UTIs after sex, a low-dose of a preventive antibiotic may be used. Be sure to discuss this option with  your healthcare provider.    Follow up with your healthcare provider as directed. He or she may test to make sure the infection has cleared. If needed, more treatment may be started.  T[employer] last reviewed this educational content on 7/1/2019 2000-2021 [employer]. All rights reserved. This information is not intended as a substitute for professional medical care. Always follow your healthcare professional's instructions.

## 2022-10-19 ENCOUNTER — OFFICE VISIT (OUTPATIENT)
Dept: FAMILY MEDICINE | Facility: CLINIC | Age: 42
End: 2022-10-19
Payer: COMMERCIAL

## 2022-10-19 VITALS
TEMPERATURE: 97.5 F | HEIGHT: 69 IN | WEIGHT: 141.3 LBS | OXYGEN SATURATION: 99 % | SYSTOLIC BLOOD PRESSURE: 110 MMHG | HEART RATE: 59 BPM | BODY MASS INDEX: 20.93 KG/M2 | DIASTOLIC BLOOD PRESSURE: 71 MMHG

## 2022-10-19 DIAGNOSIS — Z00.00 ROUTINE GENERAL MEDICAL EXAMINATION AT A HEALTH CARE FACILITY: Primary | ICD-10-CM

## 2022-10-19 DIAGNOSIS — Z11.3 SCREEN FOR STD (SEXUALLY TRANSMITTED DISEASE): ICD-10-CM

## 2022-10-19 DIAGNOSIS — R10.2 PELVIC PAIN IN FEMALE: ICD-10-CM

## 2022-10-19 DIAGNOSIS — Z11.59 NEED FOR HEPATITIS C SCREENING TEST: ICD-10-CM

## 2022-10-19 DIAGNOSIS — Z30.09 FAMILY PLANNING: ICD-10-CM

## 2022-10-19 DIAGNOSIS — N94.6 DYSMENORRHEA: ICD-10-CM

## 2022-10-19 DIAGNOSIS — Z12.4 CERVICAL CANCER SCREENING: ICD-10-CM

## 2022-10-19 LAB
HCV AB SERPL QL IA: NONREACTIVE
HIV 1+2 AB+HIV1 P24 AG SERPL QL IA: NONREACTIVE
T PALLIDUM AB SER QL: NONREACTIVE

## 2022-10-19 PROCEDURE — 90472 IMMUNIZATION ADMIN EACH ADD: CPT | Performed by: FAMILY MEDICINE

## 2022-10-19 PROCEDURE — 87389 HIV-1 AG W/HIV-1&-2 AB AG IA: CPT | Performed by: FAMILY MEDICINE

## 2022-10-19 PROCEDURE — 99395 PREV VISIT EST AGE 18-39: CPT | Mod: 25 | Performed by: FAMILY MEDICINE

## 2022-10-19 PROCEDURE — 86803 HEPATITIS C AB TEST: CPT | Performed by: FAMILY MEDICINE

## 2022-10-19 PROCEDURE — 87491 CHLMYD TRACH DNA AMP PROBE: CPT | Performed by: FAMILY MEDICINE

## 2022-10-19 PROCEDURE — 99213 OFFICE O/P EST LOW 20 MIN: CPT | Mod: 25 | Performed by: FAMILY MEDICINE

## 2022-10-19 PROCEDURE — 90715 TDAP VACCINE 7 YRS/> IM: CPT | Performed by: FAMILY MEDICINE

## 2022-10-19 PROCEDURE — 86780 TREPONEMA PALLIDUM: CPT | Performed by: FAMILY MEDICINE

## 2022-10-19 PROCEDURE — 87591 N.GONORRHOEAE DNA AMP PROB: CPT | Performed by: FAMILY MEDICINE

## 2022-10-19 PROCEDURE — 87624 HPV HI-RISK TYP POOLED RSLT: CPT | Performed by: FAMILY MEDICINE

## 2022-10-19 PROCEDURE — 90686 IIV4 VACC NO PRSV 0.5 ML IM: CPT | Performed by: FAMILY MEDICINE

## 2022-10-19 PROCEDURE — 36415 COLL VENOUS BLD VENIPUNCTURE: CPT | Performed by: FAMILY MEDICINE

## 2022-10-19 PROCEDURE — G0145 SCR C/V CYTO,THINLAYER,RESCR: HCPCS | Performed by: FAMILY MEDICINE

## 2022-10-19 PROCEDURE — 90471 IMMUNIZATION ADMIN: CPT | Performed by: FAMILY MEDICINE

## 2022-10-19 RX ORDER — ACETAMINOPHEN AND CODEINE PHOSPHATE 120; 12 MG/5ML; MG/5ML
SOLUTION ORAL
Qty: 84 TABLET | Refills: 3 | Status: SHIPPED | OUTPATIENT
Start: 2022-10-19 | End: 2023-11-27

## 2022-10-19 ASSESSMENT — ENCOUNTER SYMPTOMS: ABDOMINAL PAIN: 1

## 2022-10-19 NOTE — PROGRESS NOTES
SUBJECTIVE:   CC: Joelle is an 41 year old who presents for preventive health visit.       Patient has been advised of split billing requirements and indicates understanding: Yes  Healthy Habits:     Getting at least 3 servings of Calcium per day:  NO    Bi-annual eye exam:  Yes    Dental care twice a year:  NO    Sleep apnea or symptoms of sleep apnea:  None    Diet:  Regular (no restrictions)    Frequency of exercise:  None    Taking medications regularly:  Yes    PHQ-2 Total Score: 0    Additional concerns today:  Yes    She needs refill on oral contraceptive pills- has been on the same for over past 5 yrs- without problems and now for past 2 months unappreciable spotting at least twice a month- lasting for 4-5 days besides  . Additionally left pelvic pain- more like annoyance for past 2 months. Its not enough pain to take any medications or prevent any activities but its noticeable and has noted  The same pain with and without intercourse.  New partner for past 4 months and requesting to be screened for sexually transmitted infections.  Requesting refill on Tylenol # 3 - uses only as needed  For sever dysmennorhea- since age 18            Today's PHQ-2 Score:   PHQ-2 ( 1999 Pfizer) 10/19/2022   Q1: Little interest or pleasure in doing things 0   Q2: Feeling down, depressed or hopeless 0   PHQ-2 Score 0   PHQ-2 Total Score (12-17 Years)- Positive if 3 or more points; Administer PHQ-A if positive -   Q1: Little interest or pleasure in doing things Not at all   Q2: Feeling down, depressed or hopeless Not at all   PHQ-2 Score 0       Abuse: Current or Past (Physical, Sexual or Emotional) - No  Do you feel safe in your environment? Yes        Social History     Tobacco Use     Smoking status: Never     Smokeless tobacco: Never   Substance Use Topics     Alcohol use: Never     If you drink alcohol do you typically have >3 drinks per day or >7 drinks per week? No    Alcohol Use 10/19/2022   Prescreen: >3 drinks/day  "or >7 drinks/week? No   Prescreen: >3 drinks/day or >7 drinks/week? -   No flowsheet data found.    Breast Cancer Screening:    Breast CA Risk Assessment (FHS-7) 5/27/2021   Do you have a family history of breast, colon, or ovarian cancer? No / Unknown       click delete button to remove this line now    Pertinent mammograms are reviewed under the imaging tab.    History of abnormal Pap smear: YES - updated in Problem List and Health Maintenance accordingly  PAP / HPV Latest Ref Rng & Units 5/27/2021   PAP (Historical) - NIL   HPV16 NEG:Negative Negative   HPV18 NEG:Negative Negative   HRHPV NEG:Negative Positive(A)     Reviewed and updated as needed this visit by clinical staff   Tobacco  Allergies  Meds  Problems  Med Hx  Surg Hx  Fam Hx          Reviewed and updated as needed this visit by Provider   Tobacco  Allergies  Meds  Problems  Med Hx  Surg Hx  Fam Hx             Review of Systems   Gastrointestinal: Positive for abdominal pain.   Genitourinary: Positive for pelvic pain.     CONSTITUTIONAL: NEGATIVE for fever, chills, change in weight  INTEGUMENTARU/SKIN: NEGATIVE for worrisome rashes, moles or lesions  EYES: NEGATIVE for vision changes or irritation  ENT: NEGATIVE for ear, mouth and throat problems  RESP: NEGATIVE for significant cough or SOB  BREAST: NEGATIVE for masses, tenderness or discharge  CV: NEGATIVE for chest pain, palpitations or peripheral edema  GI: NEGATIVE for nausea, abdominal pain, heartburn, or change in bowel habits  MUSCULOSKELETAL: NEGATIVE for significant arthralgias or myalgia  NEURO: NEGATIVE for weakness, dizziness or paresthesias  PSYCHIATRIC: NEGATIVE for changes in mood or affect     OBJECTIVE:   /71   Pulse 59   Temp 97.5  F (36.4  C) (Temporal)   Ht 1.745 m (5' 8.7\")   Wt 64.1 kg (141 lb 4.8 oz)   SpO2 99%   BMI 21.05 kg/m    Physical Exam  GENERAL: healthy, alert and no distress  EYES: Eyes grossly normal to inspection, PERRL and conjunctivae and " sclerae normal  HENT: ear canals and TM's normal, nose and mouth without ulcers or lesions  NECK: no adenopathy, no asymmetry, masses, or scars and thyroid normal to palpation  RESP: lungs clear to auscultation - no rales, rhonchi or wheezes  BREAST: normal without masses, tenderness or nipple discharge and no palpable axillary masses or adenopathy  CV: regular rate and rhythm, normal S1 S2, no S3 or S4, no murmur, click or rub, no peripheral edema and peripheral pulses strong  ABDOMEN: soft, nontender, no hepatosplenomegaly, no masses and bowel sounds normal   (female): normal female external genitalia, normal urethral meatus, vaginal mucosa pink, moist, well rugated, and normal cervix/adnexa/uterus without masses or discharge  MS: no gross musculoskeletal defects noted, no edema  SKIN: no suspicious lesions or rashes  NEURO: Normal strength and tone, mentation intact and speech normal  PSYCH: mentation appears normal, affect normal/bright    Diagnostic Test Results:  Labs reviewed in Epic  No results found for this or any previous visit (from the past 24 hour(s)).    ASSESSMENT/PLAN:   Joelle was seen today for physical.    Diagnoses and all orders for this visit:    Routine general medical examination at a health care facility  Pap smear was positive for high risk HPV not 16 or or 18 there was an May 2021.  Pap smear is completed today and further recommendation based on the results.  Pelvic pain in female  -We have discussed pelvic pain and dysmenorrhea in past as well.  I do recommend to complete a ultrasound of the pelvis to rule out any unusual fibroid, unusual ovarian cyst causing adding to unpredictable spotting and or pelvic pain.     US Pelvic Complete with Transvaginal; Future  -     Ob/Gyn Referral; Future    Dysmenorrhea  Comments:  requesting tylenol # 3 for sever cramps - uses 1 once or twce each period. was getting them from GYN.- without any specific discussion about dysmenorrhea, and more  importantly she should have a follow-up to discuss unpredictable spotting while on progesterone only OCP and left-sided pelvic pain.  Orders:  -     acetaminophen-codeine (TYLENOL #3) 300-30 MG tablet; Take 1 tablet by mouth once as needed for severe pain (dysmennorhea)  -     Ob/Gyn Referral; Future    Need for hepatitis C screening test  -     Hepatitis C Screen Reflex to HCV RNA Quant and Genotype; Future  -     Hepatitis C Screen Reflex to HCV RNA Quant and Genotype    Cervical cancer screening  -2010 NIL Pap  01/16/12 ASCUS Pap, Neg HR HPV  01/28/15 NIL Pap, Neg HR HPV   06/7/18 LSIL Pap, + HR HPV (not 16 or 18)  Colposcopy: FAUZIA 1  10/10/19 ASCUS Pap, + HR HPV (not 16 or 18)  11/21/19 Colposcopy: No FAUZIA Plan: Cotesting (pap+HPV) in  - not completed due to covid  05/27/21 NIL Pap, + HR HPV (not 16 or 18) Plan cotest in 1 year due 05/27/22.  Results and recommendations released to the pt through [employer] , pt viewed.  5/9/22 Reminder letter  6/6/22 Reminder call - spoke with patient  7/1/22 Lost to follow-up for pap tracking         Pap Screen with HPV - recommended age 30 - 65 years    Family planning  Comments:  need refill on ocp  Orders:  -     norethindrone (NORLYDA) 0.35 MG tablet; TAKE 1 TABLET(0.35 MG) BY MOUTH DAILYStrength: 0.35 mg    Screen for STD (sexually transmitted disease)  -     HIV Antigen Antibody Combo; Future  -     Treponema Abs w Reflex to RPR and Titer; Future  -     Cancel: Chlamydia trachomatis PCR; Future  -     Neisseria gonorrhoeae PCR - Clinic Collect  -     Chlamydia trachomatis PCR  -     HIV Antigen Antibody Combo  -     Treponema Abs w Reflex to RPR and Titer    Other orders  -     REVIEW OF HEALTH MAINTENANCE PROTOCOL ORDERS  -     TDAP VACCINE (Adacel, Boostrix)  -     INFLUENZA VACCINE IM > 6 MONTHS VALENT IIV4 (AFLURIA/FLUZONE)        Patient has been advised of split billing requirements and indicates understanding: Yes    COUNSELING:  Reviewed preventive health  "counseling, as reflected in patient instructions       Regular exercise       Healthy diet/nutrition       Vision screening       Hearing screening       Alcohol Use       Contraception       Family planning       Folic Acid    Estimated body mass index is 21.05 kg/m  as calculated from the following:    Height as of this encounter: 1.745 m (5' 8.7\").    Weight as of this encounter: 64.1 kg (141 lb 4.8 oz).        She reports that she has never smoked. She has never used smokeless tobacco.      Counseling Resources:  ATP IV Guidelines  Pooled Cohorts Equation Calculator  Breast Cancer Risk Calculator  BRCA-Related Cancer Risk Assessment: FHS-7 Tool  FRAX Risk Assessment  ICSI Preventive Guidelines  Dietary Guidelines for Americans, 2010  USDA's MyPlate  ASA Prophylaxis  Lung CA Screening    Courtney Hedrick MD  [facility]  "

## 2022-10-19 NOTE — NURSING NOTE
Prior to immunization administration, verified patients identity using patient s name and date of birth. Please see Immunization Activity for additional information.     Screening Questionnaire for Adult Immunization    Are you sick today?   No   Do you have allergies to medications, food, a vaccine component or latex?   No   Have you ever had a serious reaction after receiving a vaccination?   No   Do you have a long-term health problem with heart, lung, kidney, or metabolic disease (e.g., diabetes), asthma, a blood disorder, no spleen, complement component deficiency, a cochlear implant, or a spinal fluid leak?  Are you on long-term aspirin therapy?   No   Do you have cancer, leukemia, HIV/AIDS, or any other immune system problem?   No   Do you have a parent, brother, or sister with an immune system problem?   No   In the past 3 months, have you taken medications that affect  your immune system, such as prednisone, other steroids, or anticancer drugs; drugs for the treatment of rheumatoid arthritis, Crohn s disease, or psoriasis; or have you had radiation treatments?   No   Have you had a seizure, or a brain or other nervous system problem?   No   During the past year, have you received a transfusion of blood or blood    products, or been given immune (gamma) globulin or antiviral drug?   No   For women: Are you pregnant or is there a chance you could become       pregnant during the next month?   No   Have you received any vaccinations in the past 4 weeks?   No     Immunization questionnaire answers were all negative.        Per orders of Dr. Hedrick, injection of tdap(adacel) and flu given by Laurence Melton. Patient instructed to remain in clinic for 15 minutes afterwards, and to report any adverse reaction to me immediately.       Screening performed by Laurence Melton on 10/19/2022 at 8:45 AM.

## 2022-10-20 LAB
C TRACH DNA SPEC QL NAA+PROBE: NEGATIVE
N GONORRHOEA DNA SPEC QL NAA+PROBE: NEGATIVE

## 2022-10-21 LAB
BKR LAB AP GYN ADEQUACY: NORMAL
BKR LAB AP GYN INTERPRETATION: NORMAL
BKR LAB AP HPV REFLEX: NORMAL
BKR LAB AP PREVIOUS ABNORMAL: NORMAL
PATH REPORT.COMMENTS IMP SPEC: NORMAL
PATH REPORT.COMMENTS IMP SPEC: NORMAL
PATH REPORT.RELEVANT HX SPEC: NORMAL

## 2022-10-26 LAB
HUMAN PAPILLOMA VIRUS 16 DNA: NEGATIVE
HUMAN PAPILLOMA VIRUS 18 DNA: NEGATIVE
HUMAN PAPILLOMA VIRUS FINAL DIAGNOSIS: ABNORMAL
HUMAN PAPILLOMA VIRUS OTHER HR: POSITIVE

## 2022-10-27 ENCOUNTER — PATIENT OUTREACH (OUTPATIENT)
Dept: FAMILY MEDICINE | Facility: CLINIC | Age: 42
End: 2022-10-27

## 2022-10-27 DIAGNOSIS — R87.810 CERVICAL HIGH RISK HPV (HUMAN PAPILLOMAVIRUS) TEST POSITIVE: Primary | ICD-10-CM

## 2022-11-21 ENCOUNTER — E-VISIT (OUTPATIENT)
Dept: URGENT CARE | Facility: CLINIC | Age: 42
End: 2022-11-21
Payer: COMMERCIAL

## 2022-11-21 DIAGNOSIS — B96.89 BACTERIAL VAGINOSIS: Primary | ICD-10-CM

## 2022-11-21 DIAGNOSIS — N76.0 BACTERIAL VAGINOSIS: Primary | ICD-10-CM

## 2022-11-21 PROCEDURE — 99421 OL DIG E/M SVC 5-10 MIN: CPT | Performed by: EMERGENCY MEDICINE

## 2022-11-21 RX ORDER — METRONIDAZOLE 500 MG/1
500 TABLET ORAL 2 TIMES DAILY
Qty: 14 TABLET | Refills: 0 | Status: SHIPPED | OUTPATIENT
Start: 2022-11-21 | End: 2022-11-28

## 2022-11-21 NOTE — PATIENT INSTRUCTIONS
Bacterial Vaginosis    You have a vaginal infection called bacterial vaginosis (BV). Both good and bad bacteria are present in a healthy vagina. BV occurs when these bacteria get out of balance. The number of bad bacteria increase. And the number of good bacteria decrease. BV is linked with sexual activity, but it's not a sexually transmitted infection (STI).   BV may or may not cause symptoms. If symptoms do occur, they can include:     Thin, gray, milky-white, or sometimes green discharge    Unpleasant odor or  fishy  smell    Itching, burning, or pain in or around the vagina  It is not known what causes BV, but certain factors can make the problem more likely. These can include:     Douching    Spermicides    Use of antibiotics    Change in hormone levels with pregnancy, breastfeeding, or menopause    Having sex with a new partner    Having sex with more than one partner  BV will sometimes go away on its own. But treatment is often advised. This is because untreated BV can raise the risk of more serious health problems such as:     Pelvic inflammatory disease (PID)     delivery (giving birth to a baby early if you re pregnant)    HIV and some other sexually transmitted infections (STIs)    Infection after surgery on the reproductive organs  Home care  General care    BV is most often treated with medicines called antibiotics. These may be given as pills or as a vaginal cream. If antibiotics are prescribed, be sure to use them exactly as directed. And complete all of the medicine, even if your symptoms go away.    Don't douche or having sex during treatment.    If you have sex with a female partner, ask your healthcare provider if she should also be treated.  Prevention    Don't douche.    Don't have sex. If you do have sex, then take steps to lower your risk:  ? Use condoms when having sex.  ? Limit the number of sex partners you have.    Follow-up care  Follow up with your healthcare provider, or as  advised.   When to get medical advice  Call your healthcare provider right away if:     You have a fever of 100.4 F (38 C) or higher, or as directed by your provider.    Your symptoms get worse, or they don t go away within a few days of starting treatment.    You have new pain in the lower belly or pelvic region.    You have side effects that bother you or a reaction to the pills or cream you re prescribed.    You or any of your sex partners have new symptoms, such as a rash, joint pain, or sores.  [employer] last reviewed this educational content on 6/1/2020 2000-2021 [employer]. All rights reserved. This information is not intended as a substitute for professional medical care. Always follow your healthcare professional's instructions.

## 2022-11-30 ENCOUNTER — OFFICE VISIT (OUTPATIENT)
Dept: FAMILY MEDICINE | Facility: CLINIC | Age: 42
End: 2022-11-30
Payer: COMMERCIAL

## 2022-11-30 VITALS
HEART RATE: 77 BPM | BODY MASS INDEX: 20.71 KG/M2 | DIASTOLIC BLOOD PRESSURE: 72 MMHG | TEMPERATURE: 98.4 F | SYSTOLIC BLOOD PRESSURE: 104 MMHG | RESPIRATION RATE: 16 BRPM | OXYGEN SATURATION: 99 % | WEIGHT: 139 LBS

## 2022-11-30 DIAGNOSIS — B97.7 HIGH RISK HPV INFECTION: Primary | ICD-10-CM

## 2022-11-30 PROCEDURE — 88305 TISSUE EXAM BY PATHOLOGIST: CPT | Performed by: PATHOLOGY

## 2022-11-30 PROCEDURE — 57454 BX/CURETT OF CERVIX W/SCOPE: CPT | Performed by: FAMILY MEDICINE

## 2022-11-30 NOTE — PROGRESS NOTES
Joelle Abrams is a .42 year old female who presents for initial colposcopy, referred by Dr Hedrick. Pap smear 1 months ago showed: normal and high risk HPV . The prior pap showed normal.     Past Medical History:   Diagnosis Date     Abnormal Pap smear of cervix 01/16/2012 01/16/12, 06/7/18, 10/10/19     Cervical high risk HPV (human papillomavirus) test positive 06/07/2018 06/07/18, 10/10/19, 05/27/21     No family history on file.    Previous history of abnormal paps?: Yes high risk HPV   History of cryotherapy (freezing)?: : No  History of veneral diseases: : No  Do you desire testing for any of these diseases? : No  History of genital warts:  No  Visible warts now?:  No  Previously treated? If so, how?:  No     No LMP recorded. (Menstrual status: Birth Control).    Type of contraception: oral contraceptive  Age at first sexual intercourse: 21  Number of sexual partners (lifetime): 6  History   Smoking Status     Never   Smokeless Tobacco     Never       History of sexual abuse:  No    No Known Allergies    PROCEDURE:  Before the procedure, it was ensured that the patient was educated regarding the nature of her findings to date, the implications of them, and what was to be done. She has been made aware of the role of HPV, the natural history of infection, ways to minimize her future risk, the effect of HPV on the cervix, and treatment options available should they be indicated. The   pathophysiology of the cervix, including a discussion of squamous vs. endometrial cells, and squamous metaplasia have all been reviewed, using illustrations and sketches. The details of the colposcopic procedure were reviewed, as well as the risks of missed diagnoses, pain, infection and bleeding. All questions were answered before proceeding, and informed consent was therefore obtained.    Bimanual examination: was performed and was unremarkable.    The following examination was done with the colposcope:    Unenhanced  examination of the cervix was normal without lesions.  Pap smear and endocervical sampling not obtained due to:    not due  Please refer to images section for details!  Pap repeated?:  No  SCJ seen?:  no  Endocervical speculum needed?:  Yes   ECC done?:  Yes   Lugol's solution used?:  Yes   Satisfactory examination?:  no    Vaginal vault: normal to cursory inspection   Urethra normal?:  yes  Labia normal?:  yes  Perineum normal?:  yes  Rectum normal?:  yes    FINDINGS:  Please see image   Cervix: no visible lesions  Procedure: biopsies taken (not including ECC): 0.    Procedure summary: Patient tolerated procedure well     Assessment: Normal exam without visible pathology      Plan: Specimens labelled and sent to pathology. and Will base further treatment on pathology findings.

## 2022-12-05 LAB
PATH REPORT.COMMENTS IMP SPEC: NORMAL
PATH REPORT.COMMENTS IMP SPEC: NORMAL
PATH REPORT.FINAL DX SPEC: NORMAL
PATH REPORT.GROSS SPEC: NORMAL
PATH REPORT.MICROSCOPIC SPEC OTHER STN: NORMAL
PATH REPORT.RELEVANT HX SPEC: NORMAL
PHOTO IMAGE: NORMAL

## 2022-12-19 ENCOUNTER — PATIENT OUTREACH (OUTPATIENT)
Dept: FAMILY MEDICINE | Facility: CLINIC | Age: 42
End: 2022-12-19

## 2022-12-19 DIAGNOSIS — R87.810 CERVICAL HIGH RISK HPV (HUMAN PAPILLOMAVIRUS) TEST POSITIVE: Primary | ICD-10-CM

## 2023-11-14 ENCOUNTER — PATIENT OUTREACH (OUTPATIENT)
Dept: FAMILY MEDICINE | Facility: CLINIC | Age: 43
End: 2023-11-14
Payer: COMMERCIAL

## 2023-11-14 DIAGNOSIS — R87.810 CERVICAL HIGH RISK HPV (HUMAN PAPILLOMAVIRUS) TEST POSITIVE: Primary | ICD-10-CM

## 2023-11-24 DIAGNOSIS — Z30.09 FAMILY PLANNING: ICD-10-CM

## 2023-11-27 RX ORDER — ACETAMINOPHEN AND CODEINE PHOSPHATE 120; 12 MG/5ML; MG/5ML
SOLUTION ORAL
Qty: 84 TABLET | Refills: 1 | Status: SHIPPED | OUTPATIENT
Start: 2023-11-27

## 2024-01-09 PROBLEM — R87.810 CERVICAL HIGH RISK HPV (HUMAN PAPILLOMAVIRUS) TEST POSITIVE: Status: ACTIVE | Noted: 2021-05-27

## 2024-01-09 NOTE — TELEPHONE ENCOUNTER
FYI to provider - Patient is lost to pap tracking follow-up. Attempts to contact pt have been made per reminder process and there has been no reply and/or no appt scheduled. Contact hx listed below.     2010 NIL Pap  01/16/12 ASCUS Pap, Neg HR HPV  01/28/15 NIL Pap, Neg HR HPV   06/7/18 LSIL Pap, + HR HPV (not 16 or 18)  Colposcopy: FAUZIA 1  10/10/19 ASCUS Pap, + HR HPV (not 16 or 18)  11/21/19 Colposcopy: No FAUZIA Plan: Cotesting (pap+HPV) in  - not completed due to covid  05/27/21 NIL Pap, + HR HPV (not 16 or 18) Plan cotest in 1 year due 05/27/22.  Results and recommendations released to the pt through [employer] , pt viewed.  7/1/22 Lost to follow-up for pap tracking  10/19/22 NIL, +HR HPV, not 16/18. Plan [facility] bef 1/19/23 / notified  11/30/22 [facility]: ECC, normal. Plan 1 yr co-test / notified  11/14/23 Reminder MyChart  12/14/23 Reminder call-LM  1/9/24 Lost to follow-up for pap tracking

## 2024-01-21 ENCOUNTER — HEALTH MAINTENANCE LETTER (OUTPATIENT)
Age: 44
End: 2024-01-21

## 2024-03-31 ENCOUNTER — HEALTH MAINTENANCE LETTER (OUTPATIENT)
Age: 44
End: 2024-03-31

## 2025-02-01 ENCOUNTER — HEALTH MAINTENANCE LETTER (OUTPATIENT)
Age: 45
End: 2025-02-01

## 2025-04-12 DIAGNOSIS — Z30.09 FAMILY PLANNING: ICD-10-CM

## 2025-04-14 RX ORDER — ACETAMINOPHEN AND CODEINE PHOSPHATE 120; 12 MG/5ML; MG/5ML
0.35 SOLUTION ORAL
Qty: 84 TABLET | Refills: 1 | OUTPATIENT
Start: 2025-04-14

## 2025-04-14 NOTE — TELEPHONE ENCOUNTER
Called patient [facility] to call and schedule a Appt has been 2 years  Mary Chapa  Care Unit Coordinator